# Patient Record
Sex: MALE | Race: WHITE | NOT HISPANIC OR LATINO | URBAN - METROPOLITAN AREA
[De-identification: names, ages, dates, MRNs, and addresses within clinical notes are randomized per-mention and may not be internally consistent; named-entity substitution may affect disease eponyms.]

---

## 2019-06-24 ENCOUNTER — OFFICE (OUTPATIENT)
Dept: URBAN - METROPOLITAN AREA CLINIC 11 | Facility: CLINIC | Age: 54
End: 2019-06-24
Payer: COMMERCIAL

## 2019-06-24 VITALS
DIASTOLIC BLOOD PRESSURE: 84 MMHG | HEIGHT: 75 IN | HEART RATE: 60 BPM | WEIGHT: 315 LBS | SYSTOLIC BLOOD PRESSURE: 181 MMHG

## 2019-06-24 DIAGNOSIS — K59.00 CONSTIPATION, UNSPECIFIED: ICD-10-CM

## 2019-06-24 DIAGNOSIS — R60.9 EDEMA, UNSPECIFIED: ICD-10-CM

## 2019-06-24 DIAGNOSIS — R18.8 OTHER ASCITES: ICD-10-CM

## 2019-06-24 DIAGNOSIS — R19.07 GENERALIZED INTRA-ABDOMINAL AND PELVIC SWELLING, MASS AND LU: ICD-10-CM

## 2019-06-24 DIAGNOSIS — F10.10 ALCOHOL ABUSE, UNCOMPLICATED: ICD-10-CM

## 2019-06-24 LAB
ACTIN (SMOOTH MUSCLE) ANTIBODY: 12 UNITS (ref 0–19)
AFP, SERUM, TUMOR MARKER: 2.4 NG/ML (ref 0–8.3)
ALPHA-1-ANTITRYPSIN PHENOTYP: ALPHA-1-ANTITRYPSIN, SERUM: 163 MG/DL (ref 90–200)
ALPHA-1-ANTITRYPSIN PHENOTYP: PHENOTYPE (PI): (no result)
ANTINUCLEAR ANTIBODIES, IFA: NEGATIVE
CBC, PLATELET, NO DIFFERENTIAL: HEMATOCRIT: 39.1 % (ref 37.5–51)
CBC, PLATELET, NO DIFFERENTIAL: HEMOGLOBIN: 13 G/DL (ref 13–17.7)
CBC, PLATELET, NO DIFFERENTIAL: MCH: 31 PG (ref 26.6–33)
CBC, PLATELET, NO DIFFERENTIAL: MCHC: 33.2 G/DL (ref 31.5–35.7)
CBC, PLATELET, NO DIFFERENTIAL: MCV: 93 FL (ref 79–97)
CBC, PLATELET, NO DIFFERENTIAL: PLATELETS: 277 X10E3/UL (ref 150–450)
CBC, PLATELET, NO DIFFERENTIAL: RBC: 4.2 X10E6/UL (ref 4.14–5.8)
CBC, PLATELET, NO DIFFERENTIAL: RDW: 13.8 % (ref 12.3–15.4)
CBC, PLATELET, NO DIFFERENTIAL: WBC: 11.4 X10E3/UL — HIGH (ref 3.4–10.8)
CERULOPLASMIN: 37 MG/DL — HIGH (ref 16–31)
COMP. METABOLIC PANEL (14): A/G RATIO: 1.4 (ref 1.2–2.2)
COMP. METABOLIC PANEL (14): ALBUMIN: 3.9 G/DL (ref 3.5–5.5)
COMP. METABOLIC PANEL (14): ALKALINE PHOSPHATASE: 196 IU/L — HIGH (ref 39–117)
COMP. METABOLIC PANEL (14): ALT (SGPT): 23 IU/L (ref 0–44)
COMP. METABOLIC PANEL (14): AST (SGOT): 35 IU/L (ref 0–40)
COMP. METABOLIC PANEL (14): BILIRUBIN, TOTAL: 0.6 MG/DL (ref 0–1.2)
COMP. METABOLIC PANEL (14): BUN/CREATININE RATIO: 13 (ref 9–20)
COMP. METABOLIC PANEL (14): BUN: 12 MG/DL (ref 6–24)
COMP. METABOLIC PANEL (14): CALCIUM: 9 MG/DL (ref 8.7–10.2)
COMP. METABOLIC PANEL (14): CARBON DIOXIDE, TOTAL: 20 MMOL/L (ref 20–29)
COMP. METABOLIC PANEL (14): CHLORIDE: 85 MMOL/L — LOW (ref 96–106)
COMP. METABOLIC PANEL (14): CREATININE: 0.96 MG/DL (ref 0.76–1.27)
COMP. METABOLIC PANEL (14): EGFR IF AFRICN AM: 105 ML/MIN/1.73 (ref 59–?)
COMP. METABOLIC PANEL (14): EGFR IF NONAFRICN AM: 91 ML/MIN/1.73 (ref 59–?)
COMP. METABOLIC PANEL (14): GLOBULIN, TOTAL: 2.7 G/DL (ref 1.5–4.5)
COMP. METABOLIC PANEL (14): GLUCOSE: 105 MG/DL — HIGH (ref 65–99)
COMP. METABOLIC PANEL (14): POTASSIUM: 4.9 MMOL/L (ref 3.5–5.2)
COMP. METABOLIC PANEL (14): PROTEIN, TOTAL: 6.6 G/DL (ref 6–8.5)
COMP. METABOLIC PANEL (14): SODIUM: 121 MMOL/L — LOW (ref 134–144)
FE+TIBC+FER: FERRITIN, SERUM: 40 NG/ML (ref 30–400)
FE+TIBC+FER: IRON BIND.CAP.(TIBC): 388 UG/DL (ref 250–450)
FE+TIBC+FER: IRON SATURATION: 12 % — LOW (ref 15–55)
FE+TIBC+FER: IRON: 46 UG/DL (ref 38–169)
FE+TIBC+FER: UIBC: 342 UG/DL (ref 111–343)
HBSAG SCREEN: NEGATIVE
HCV ANTIBODY: HEP C VIRUS AB: <0.1 S/CO RATIO
HEP A AB, TOTAL: NEGATIVE
HEP B CORE AB, IGM: NEGATIVE
HEP B CORE AB, TOT: NEGATIVE
HEP B SURFACE AB: HEP B SURFACE AB, QUAL: NON REACTIVE
PROTHROMBIN TIME (PT): INR: 1.1 (ref 0.8–1.2)
PROTHROMBIN TIME (PT): PROTHROMBIN TIME: 11.5 SEC (ref 9.1–12)

## 2019-06-24 PROCEDURE — 99204 OFFICE O/P NEW MOD 45 MIN: CPT | Performed by: INTERNAL MEDICINE

## 2019-06-24 NOTE — SERVICEHPINOTES
Mr. Hernandez is a 53-year-old man here for evaluation of abdominal distention and constipation. The patient states that he normally has regular bowel movements around three to 4 times a day which are soft and formed but more recently has been having only one bowel movement a day. Over the past couple of weeks he has been having more issues with abdominal distention as well as swelling of his abdomen and edema of his legs, arms, etc. The patient does admit to drinking at least 6-8 beers on most days but sometimes will drink a 12 pack. He has done so for many years. He does have a family history of cirrhosis in his father who was a heavy drinker. Patient has never had a colonoscopy or EGD. There is no first-degree family history of colon cancer colon polyps. He was seen by cardiologist recently because of irregular heartbeat and is now on a medication for this. He states that he has been told he has had some low sodium in the past and has been told to eat more salt. He denies ever being told that he had cirrhosis or hepatitis before. He denies any IV drug use or illicit drug use but he does chew tobacco. He is on amiodarone now, which was started several weeks ago.

## 2019-06-24 NOTE — SERVICENOTES
The patient does appear clinically have ascites and has some physical stigmata of cirrhosis.  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  With that said we will complete a full hepatic workup as above and I do recommend that he undergo paracentesis due to his large volume ascites.  We will check labs as above..  He is also due for average risk screening colonoscopy which we can perform with EGD due likely cirrhosis for variceal screening once his fluid status is improved.  I will hold off on prescribing any fluid pills at this time but will wait until we get lab work and will likely start him on spironolactone and furosemide.  I did discuss the importance of stopping alcohol and also low-salt diet.  Since he has low sodium he probably will need a fluid restriction as well. The patient should otherwise notify us if he has any signs or symptoms of decompensation in the meantime.

## 2019-07-12 ENCOUNTER — OFFICE (OUTPATIENT)
Dept: URBAN - METROPOLITAN AREA CLINIC 11 | Facility: CLINIC | Age: 54
End: 2019-07-12

## 2025-08-12 ENCOUNTER — OFFICE (OUTPATIENT)
Dept: URBAN - METROPOLITAN AREA CLINIC 11 | Facility: CLINIC | Age: 60
End: 2025-08-12

## 2025-08-12 VITALS
WEIGHT: 252 LBS | DIASTOLIC BLOOD PRESSURE: 63 MMHG | DIASTOLIC BLOOD PRESSURE: 74 MMHG | HEART RATE: 87 BPM | SYSTOLIC BLOOD PRESSURE: 163 MMHG | OXYGEN SATURATION: 99 % | SYSTOLIC BLOOD PRESSURE: 148 MMHG | HEIGHT: 75 IN

## 2025-08-12 DIAGNOSIS — R85.89 OTHER ABNORMAL FINDINGS IN SPECIMENS FROM DIGESTIVE ORGANS A: ICD-10-CM

## 2025-08-12 DIAGNOSIS — Z79.01 LONG TERM (CURRENT) USE OF ANTICOAGULANTS: ICD-10-CM

## 2025-08-12 DIAGNOSIS — I48.91 UNSPECIFIED ATRIAL FIBRILLATION: ICD-10-CM

## 2025-08-12 DIAGNOSIS — K74.60 UNSPECIFIED CIRRHOSIS OF LIVER: ICD-10-CM

## 2025-08-12 PROCEDURE — 99204 OFFICE O/P NEW MOD 45 MIN: CPT | Performed by: INTERNAL MEDICINE

## 2025-08-12 RX ORDER — SODIUM SULFATE, POTASSIUM SULFATE, MAGNESIUM SULFATE 1.6; 3.13; 17.5 G/ML; G/ML; G/ML
SOLUTION, CONCENTRATE ORAL
Qty: 1 | Refills: 0 | Status: ACTIVE
Start: 2025-08-12

## 2025-08-13 LAB
AFP, SERUM, TUMOR MARKER: <1.8 NG/ML
CBC, PLATELET, NO DIFFERENTIAL: HEMATOCRIT: 41.7 % (ref 37.5–51)
CBC, PLATELET, NO DIFFERENTIAL: HEMOGLOBIN: 13.9 G/DL (ref 13–17.7)
CBC, PLATELET, NO DIFFERENTIAL: MCH: 31.9 PG (ref 26.6–33)
CBC, PLATELET, NO DIFFERENTIAL: MCHC: 33.3 G/DL (ref 31.5–35.7)
CBC, PLATELET, NO DIFFERENTIAL: MCV: 96 FL (ref 79–97)
CBC, PLATELET, NO DIFFERENTIAL: PLATELETS: 301 X10E3/UL (ref 150–450)
CBC, PLATELET, NO DIFFERENTIAL: RBC: 4.36 X10E6/UL (ref 4.14–5.8)
CBC, PLATELET, NO DIFFERENTIAL: RDW: 12.3 % (ref 11.6–15.4)
CBC, PLATELET, NO DIFFERENTIAL: WBC: 9.8 X10E3/UL (ref 3.4–10.8)
COMP. METABOLIC PANEL (14): ALBUMIN: 4.5 G/DL (ref 3.8–4.9)
COMP. METABOLIC PANEL (14): ALKALINE PHOSPHATASE: 91 IU/L (ref 44–121)
COMP. METABOLIC PANEL (14): ALT (SGPT): 15 IU/L (ref 0–44)
COMP. METABOLIC PANEL (14): AST (SGOT): 20 IU/L (ref 0–40)
COMP. METABOLIC PANEL (14): BILIRUBIN, TOTAL: 0.6 MG/DL (ref 0–1.2)
COMP. METABOLIC PANEL (14): BUN/CREATININE RATIO: 15 (ref 9–20)
COMP. METABOLIC PANEL (14): BUN: 10 MG/DL (ref 6–24)
COMP. METABOLIC PANEL (14): CALCIUM: 9.3 MG/DL (ref 8.7–10.2)
COMP. METABOLIC PANEL (14): CARBON DIOXIDE, TOTAL: 20 MMOL/L (ref 20–29)
COMP. METABOLIC PANEL (14): CHLORIDE: 95 MMOL/L — LOW (ref 96–106)
COMP. METABOLIC PANEL (14): CREATININE: 0.65 MG/DL — LOW (ref 0.76–1.27)
COMP. METABOLIC PANEL (14): EGFR: 110 ML/MIN/1.73 (ref 59–?)
COMP. METABOLIC PANEL (14): GLOBULIN, TOTAL: 2.4 G/DL (ref 1.5–4.5)
COMP. METABOLIC PANEL (14): GLUCOSE: 97 MG/DL (ref 70–99)
COMP. METABOLIC PANEL (14): POTASSIUM: 5.3 MMOL/L — HIGH (ref 3.5–5.2)
COMP. METABOLIC PANEL (14): PROTEIN, TOTAL: 6.9 G/DL (ref 6–8.5)
COMP. METABOLIC PANEL (14): SODIUM: 133 MMOL/L — LOW (ref 134–144)
PROTHROMBIN TIME (PT): INR: 1 (ref 0.9–1.2)
PROTHROMBIN TIME (PT): PROTHROMBIN TIME: 10.8 SEC (ref 9.1–12)